# Patient Record
Sex: MALE | Race: BLACK OR AFRICAN AMERICAN | NOT HISPANIC OR LATINO | Employment: FULL TIME | URBAN - METROPOLITAN AREA
[De-identification: names, ages, dates, MRNs, and addresses within clinical notes are randomized per-mention and may not be internally consistent; named-entity substitution may affect disease eponyms.]

---

## 2023-07-19 ENCOUNTER — OCCUPATIONAL HEALTH (OUTPATIENT)
Dept: URGENT CARE | Facility: CLINIC | Age: 23
End: 2023-07-19
Payer: OTHER GOVERNMENT

## 2023-07-19 DIAGNOSIS — Z02.83 ENCOUNTER FOR DRUG SCREENING: Primary | ICD-10-CM

## 2023-07-19 LAB
CTP QC/QA: YES
POC 10 PANEL DRUG SCREEN: NEGATIVE

## 2023-07-19 PROCEDURE — 80305 DRUG TEST PRSMV DIR OPT OBS: CPT | Mod: S$GLB,,, | Performed by: PHYSICIAN ASSISTANT

## 2023-07-19 PROCEDURE — 80305 POCT RAPID DRUG SCREEN 10 PANEL: ICD-10-PCS | Mod: S$GLB,,, | Performed by: PHYSICIAN ASSISTANT

## 2023-08-01 ENCOUNTER — OFFICE VISIT (OUTPATIENT)
Dept: URGENT CARE | Facility: CLINIC | Age: 23
End: 2023-08-01
Payer: OTHER GOVERNMENT

## 2023-08-01 VITALS
DIASTOLIC BLOOD PRESSURE: 73 MMHG | SYSTOLIC BLOOD PRESSURE: 119 MMHG | HEIGHT: 77 IN | HEART RATE: 62 BPM | OXYGEN SATURATION: 100 % | RESPIRATION RATE: 18 BRPM | BODY MASS INDEX: 21.25 KG/M2 | TEMPERATURE: 98 F | WEIGHT: 180 LBS

## 2023-08-01 DIAGNOSIS — Z20.2 POSSIBLE EXPOSURE TO STD: Primary | ICD-10-CM

## 2023-08-01 PROCEDURE — 87591 N.GONORRHOEAE DNA AMP PROB: CPT | Performed by: PHYSICIAN ASSISTANT

## 2023-08-01 PROCEDURE — 87661 TRICHOMONAS VAGINALIS AMPLIF: CPT | Performed by: PHYSICIAN ASSISTANT

## 2023-08-01 PROCEDURE — 99203 OFFICE O/P NEW LOW 30 MIN: CPT | Mod: S$GLB,,, | Performed by: PHYSICIAN ASSISTANT

## 2023-08-01 PROCEDURE — 99203 PR OFFICE/OUTPT VISIT, NEW, LEVL III, 30-44 MIN: ICD-10-PCS | Mod: S$GLB,,, | Performed by: PHYSICIAN ASSISTANT

## 2023-08-01 NOTE — PATIENT INSTRUCTIONS
You should not have sex until you complete the medications that were prescribed and/or he received all test results.  All partners will need to be tested and treated if you test positive for anything.  You should use condoms in the future to prevent sexually transmitted infections.    The proper amount of time needed to pass before getting proper test results after lyric a sexually transmitted infection are as follows:  2 weeks: trichomonas, gonorrhea and chlamydia   1 week to 3 months: syphilis (RPR)  6 weeks to 3 months: HIV, Herpes, hepatitis C and B.     If you have negative tests please make sure to repeat testing in 3-6 months.      You must understand that you've received an Urgent Care treatment only and that you may be released before all your medical problems are known or treated. You, the patient, will arrange for follow up care as instructed.      Follow up with your PCP or specialty clinic as instructed in the next 2-3 days if not improved or as needed. You can call [phone] to schedule an appointment with appropriate provider.      If you condition worsens, we recommend that you receive another evaluation at the emergency room immediately or contact your primary medical clinic's after hours call service to discuss your concerns.      Please return here or go to the Emergency Department for any concerns or worsening condition.      If you were prescribed a narcotic or controlled substance, do not drive or operate heavy equipment or machinery while taking these medications.

## 2023-08-01 NOTE — PROGRESS NOTES
"Subjective:      Patient ID: Ruben Coombs is a 23 y.o. male.    Vitals:  height is 6' 5" (1.956 m) and weight is 81.6 kg (180 lb). His oral temperature is 98.2 °F (36.8 °C). His blood pressure is 119/73 and his pulse is 62. His respiration is 18 and oxygen saturation is 100%.     Chief Complaint: Exposure to STD    Pt states he has not been exposed to any stds that he knows of just wants to do a random check up.     Patient provider note starts here:  Patient presents to the clinic with request to have STD testing.  Denies any symptoms but reports that he usually gets tested about every 2 months.  Reports being sexually active with 2 female partners and does not use condoms.  Specifically denies penile discharge, testicular pain or swelling, dysuria.    Exposure to STD  The patient's pertinent negatives include no genital injury, genital itching, genital lesions, pelvic pain, penile discharge, penile pain, priapism, scrotal swelling or testicular pain. This is a new problem. The current episode started today. The problem occurs constantly. The problem has been unchanged. The patient is experiencing no pain. Pertinent negatives include no abdominal pain, anorexia, chest pain, chills, coughing, diarrhea, discolored urine, dysuria, fever, flank pain, frequency, headaches, hematuria, joint swelling, nausea, painful intercourse, rash, sore throat, urgency or vomiting. Nothing aggravates the symptoms. He has tried nothing for the symptoms. The treatment provided no relief. He is sexually active with multiple partners. He inconsistently uses condoms. No, his partner does not have an STD. There is no history of BPH, chlamydia, cryptorchidism, erectile aid use, erectile dysfunction, a femoral hernia, gonorrhea, herpes simplex, HIV, an inguinal hernia, kidney stones, prostatitis, sickle cell disease, syphilis or varicocele.       Constitution: Negative for chills and fever.   HENT:  Negative for sore throat.    Neck: " Negative for neck pain and neck stiffness.   Cardiovascular:  Negative for chest pain.   Respiratory:  Negative for chest tightness, cough and wheezing.    Gastrointestinal:  Negative for abdominal pain, nausea, vomiting and diarrhea.   Genitourinary:  Negative for dysuria, frequency, urgency, flank pain, penile discharge, penile pain, scrotal swelling, testicular pain and pelvic pain.   Musculoskeletal:  Negative for pain.   Skin:  Negative for rash and wound.   Allergic/Immunologic: Negative for itching.   Neurological:  Negative for headaches, numbness and tingling.      Objective:     Physical Exam   Constitutional: He is oriented to person, place, and time. He appears well-developed. No distress.   HENT:   Head: Normocephalic and atraumatic.   Ears:   Right Ear: External ear normal.   Left Ear: External ear normal.   Nose: Nose normal. No nasal deformity. No epistaxis.   Mouth/Throat: Oropharynx is clear and moist and mucous membranes are normal.   Eyes: Conjunctivae and lids are normal.   Neck: Trachea normal and phonation normal. Neck supple.   Cardiovascular: Normal rate, regular rhythm and normal heart sounds.   Pulmonary/Chest: Effort normal and breath sounds normal.   Abdominal: Normal appearance and bowel sounds are normal. He exhibits no distension. Soft. There is no abdominal tenderness.   Musculoskeletal: Normal range of motion.         General: Normal range of motion.   Neurological: He is alert and oriented to person, place, and time. He has normal reflexes.   Skin: Skin is warm, dry, intact and not diaphoretic.   Psychiatric: His speech is normal and behavior is normal. Judgment and thought content normal.   Nursing note and vitals reviewed.      Assessment:     1. Possible exposure to STD        Plan:       Possible exposure to STD  -     C. trachomatis/N. gonorrhoeae by AMP DNA [facility]; Urine  -     Trichomonas vaginalis, RNA, Qual, Urine          Medical Decision Making:   History:   Old Medical  Records: I decided to obtain old medical records.  Clinical Tests:   Lab Tests: Ordered  Urgent Care Management:  Patient presents requesting STI testing.  On exam, he is afebrile and nontoxic in appearance.  Vital signs stable.  Denies all symptoms at this time therefore, holding off on empiric therapy until test results are received.  Advised safe sex practices for the future but to hold off on intercourse until all test results are back and partners are tested/treated if needed.  He verbalized understanding.           Patient Instructions   You should not have sex until you complete the medications that were prescribed and/or he received all test results.  All partners will need to be tested and treated if you test positive for anything.  You should use condoms in the future to prevent sexually transmitted infections.    The proper amount of time needed to pass before getting proper test results after lyric a sexually transmitted infection are as follows:  2 weeks: trichomonas, gonorrhea and chlamydia   1 week to 3 months: syphilis (RPR)  6 weeks to 3 months: HIV, Herpes, hepatitis C and B.     If you have negative tests please make sure to repeat testing in 3-6 months.      You must understand that you've received an Urgent Care treatment only and that you may be released before all your medical problems are known or treated. You, the patient, will arrange for follow up care as instructed.      Follow up with your PCP or specialty clinic as instructed in the next 2-3 days if not improved or as needed. You can call [phone] to schedule an appointment with appropriate provider.      If you condition worsens, we recommend that you receive another evaluation at the emergency room immediately or contact your primary medical clinic's after hours call service to discuss your concerns.      Please return here or go to the Emergency Department for any concerns or worsening condition.      If you were  prescribed a narcotic or controlled substance, do not drive or operate heavy equipment or machinery while taking these medications.

## 2023-08-03 LAB
C TRACH DNA SPEC QL NAA+PROBE: NOT DETECTED
N GONORRHOEA DNA SPEC QL NAA+PROBE: NOT DETECTED

## 2023-08-04 ENCOUNTER — TELEPHONE (OUTPATIENT)
Dept: URGENT CARE | Facility: CLINIC | Age: 23
End: 2023-08-04
Payer: OTHER GOVERNMENT

## 2023-08-05 ENCOUNTER — TELEPHONE (OUTPATIENT)
Dept: URGENT CARE | Facility: CLINIC | Age: 23
End: 2023-08-05
Payer: OTHER GOVERNMENT

## 2023-08-05 LAB
SPECIMEN SOURCE: NORMAL
T VAGINALIS RRNA SPEC QL NAA+PROBE: NEGATIVE

## 2023-08-05 NOTE — TELEPHONE ENCOUNTER
Spoke with pt regarding previous visit and informed him that his test results came back negative. Pt states that he is feeling fine.

## 2023-08-06 ENCOUNTER — TELEPHONE (OUTPATIENT)
Dept: URGENT CARE | Facility: CLINIC | Age: 23
End: 2023-08-06
Payer: OTHER GOVERNMENT